# Patient Record
Sex: FEMALE | Race: WHITE | NOT HISPANIC OR LATINO | URBAN - METROPOLITAN AREA
[De-identification: names, ages, dates, MRNs, and addresses within clinical notes are randomized per-mention and may not be internally consistent; named-entity substitution may affect disease eponyms.]

---

## 2020-03-05 ENCOUNTER — EMERGENCY (EMERGENCY)
Facility: HOSPITAL | Age: 60
LOS: 1 days | Discharge: HOME | End: 2020-03-05
Attending: EMERGENCY MEDICINE
Payer: COMMERCIAL

## 2020-03-05 VITALS
DIASTOLIC BLOOD PRESSURE: 84 MMHG | RESPIRATION RATE: 16 BRPM | OXYGEN SATURATION: 97 % | SYSTOLIC BLOOD PRESSURE: 145 MMHG | HEART RATE: 88 BPM

## 2020-03-05 LAB
ALBUMIN SERPL ELPH-MCNC: 4.7 G/DL — SIGNIFICANT CHANGE UP (ref 3.3–5)
ALP SERPL-CCNC: 46 U/L — SIGNIFICANT CHANGE UP (ref 40–120)
ALT FLD-CCNC: 15 U/L — SIGNIFICANT CHANGE UP (ref 10–45)
ANION GAP SERPL CALC-SCNC: 19 MMOL/L — HIGH (ref 5–17)
APTT BLD: 33 SEC — SIGNIFICANT CHANGE UP (ref 27.5–36.3)
AST SERPL-CCNC: 18 U/L — SIGNIFICANT CHANGE UP (ref 10–40)
BASE EXCESS BLDV CALC-SCNC: -0.6 MMOL/L — SIGNIFICANT CHANGE UP (ref -2–2)
BASOPHILS # BLD AUTO: 0.02 K/UL — SIGNIFICANT CHANGE UP (ref 0–0.2)
BASOPHILS NFR BLD AUTO: 0.3 % — SIGNIFICANT CHANGE UP (ref 0–2)
BILIRUB SERPL-MCNC: 0.2 MG/DL — SIGNIFICANT CHANGE UP (ref 0.2–1.2)
BUN SERPL-MCNC: 8 MG/DL — SIGNIFICANT CHANGE UP (ref 7–23)
CA-I SERPL-SCNC: 1.17 MMOL/L — SIGNIFICANT CHANGE UP (ref 1.12–1.3)
CALCIUM SERPL-MCNC: 9.5 MG/DL — SIGNIFICANT CHANGE UP (ref 8.4–10.5)
CHLORIDE BLDV-SCNC: 108 MMOL/L — SIGNIFICANT CHANGE UP (ref 96–108)
CHLORIDE SERPL-SCNC: 103 MMOL/L — SIGNIFICANT CHANGE UP (ref 96–108)
CO2 BLDV-SCNC: 27 MMOL/L — SIGNIFICANT CHANGE UP (ref 22–30)
CO2 SERPL-SCNC: 21 MMOL/L — LOW (ref 22–31)
CREAT SERPL-MCNC: 0.64 MG/DL — SIGNIFICANT CHANGE UP (ref 0.5–1.3)
EOSINOPHIL # BLD AUTO: 0.05 K/UL — SIGNIFICANT CHANGE UP (ref 0–0.5)
EOSINOPHIL NFR BLD AUTO: 0.6 % — SIGNIFICANT CHANGE UP (ref 0–6)
ETHANOL SERPL-MCNC: 346 MG/DL — HIGH (ref 0–10)
GAS PNL BLDV: 143 MMOL/L — SIGNIFICANT CHANGE UP (ref 135–145)
GAS PNL BLDV: SIGNIFICANT CHANGE UP
GLUCOSE BLDV-MCNC: 104 MG/DL — HIGH (ref 70–99)
GLUCOSE SERPL-MCNC: 108 MG/DL — HIGH (ref 70–99)
HCO3 BLDV-SCNC: 26 MMOL/L — SIGNIFICANT CHANGE UP (ref 21–29)
HCT VFR BLD CALC: 39.1 % — SIGNIFICANT CHANGE UP (ref 34.5–45)
HCT VFR BLDA CALC: 42 % — SIGNIFICANT CHANGE UP (ref 39–50)
HGB BLD CALC-MCNC: 13.8 G/DL — SIGNIFICANT CHANGE UP (ref 11.5–15.5)
HGB BLD-MCNC: 13.2 G/DL — SIGNIFICANT CHANGE UP (ref 11.5–15.5)
IMM GRANULOCYTES NFR BLD AUTO: 0.1 % — SIGNIFICANT CHANGE UP (ref 0–1.5)
INR BLD: 0.87 RATIO — LOW (ref 0.88–1.16)
LACTATE BLDV-MCNC: 3 MMOL/L — HIGH (ref 0.7–2)
LYMPHOCYTES # BLD AUTO: 3.68 K/UL — HIGH (ref 1–3.3)
LYMPHOCYTES # BLD AUTO: 47.6 % — HIGH (ref 13–44)
MCHC RBC-ENTMCNC: 32.8 PG — SIGNIFICANT CHANGE UP (ref 27–34)
MCHC RBC-ENTMCNC: 33.8 GM/DL — SIGNIFICANT CHANGE UP (ref 32–36)
MCV RBC AUTO: 97.3 FL — SIGNIFICANT CHANGE UP (ref 80–100)
MONOCYTES # BLD AUTO: 0.56 K/UL — SIGNIFICANT CHANGE UP (ref 0–0.9)
MONOCYTES NFR BLD AUTO: 7.2 % — SIGNIFICANT CHANGE UP (ref 2–14)
NEUTROPHILS # BLD AUTO: 3.41 K/UL — SIGNIFICANT CHANGE UP (ref 1.8–7.4)
NEUTROPHILS NFR BLD AUTO: 44.2 % — SIGNIFICANT CHANGE UP (ref 43–77)
NRBC # BLD: 0 /100 WBCS — SIGNIFICANT CHANGE UP (ref 0–0)
PCO2 BLDV: 50 MMHG — SIGNIFICANT CHANGE UP (ref 35–50)
PH BLDV: 7.33 — LOW (ref 7.35–7.45)
PLATELET # BLD AUTO: 324 K/UL — SIGNIFICANT CHANGE UP (ref 150–400)
PO2 BLDV: 40 MMHG — SIGNIFICANT CHANGE UP (ref 25–45)
POTASSIUM BLDV-SCNC: 3.5 MMOL/L — SIGNIFICANT CHANGE UP (ref 3.5–5.3)
POTASSIUM SERPL-MCNC: 3.7 MMOL/L — SIGNIFICANT CHANGE UP (ref 3.5–5.3)
POTASSIUM SERPL-SCNC: 3.7 MMOL/L — SIGNIFICANT CHANGE UP (ref 3.5–5.3)
PROT SERPL-MCNC: 7.5 G/DL — SIGNIFICANT CHANGE UP (ref 6–8.3)
PROTHROM AB SERPL-ACNC: 9.9 SEC — LOW (ref 10–12.9)
RBC # BLD: 4.02 M/UL — SIGNIFICANT CHANGE UP (ref 3.8–5.2)
RBC # FLD: 12.2 % — SIGNIFICANT CHANGE UP (ref 10.3–14.5)
SAO2 % BLDV: 61 % — LOW (ref 67–88)
SODIUM SERPL-SCNC: 143 MMOL/L — SIGNIFICANT CHANGE UP (ref 135–145)
WBC # BLD: 7.73 K/UL — SIGNIFICANT CHANGE UP (ref 3.8–10.5)
WBC # FLD AUTO: 7.73 K/UL — SIGNIFICANT CHANGE UP (ref 3.8–10.5)

## 2020-03-05 PROCEDURE — 71045 X-RAY EXAM CHEST 1 VIEW: CPT | Mod: 26

## 2020-03-05 PROCEDURE — 70450 CT HEAD/BRAIN W/O DYE: CPT | Mod: 26

## 2020-03-05 PROCEDURE — 72125 CT NECK SPINE W/O DYE: CPT | Mod: 26

## 2020-03-05 PROCEDURE — 99291 CRITICAL CARE FIRST HOUR: CPT

## 2020-03-05 RX ORDER — SODIUM CHLORIDE 9 MG/ML
1000 INJECTION INTRAMUSCULAR; INTRAVENOUS; SUBCUTANEOUS ONCE
Refills: 0 | Status: COMPLETED | OUTPATIENT
Start: 2020-03-05 | End: 2020-03-05

## 2020-03-05 RX ORDER — HALOPERIDOL DECANOATE 100 MG/ML
2 INJECTION INTRAMUSCULAR ONCE
Refills: 0 | Status: COMPLETED | OUTPATIENT
Start: 2020-03-05 | End: 2020-03-05

## 2020-03-05 RX ORDER — ONDANSETRON 8 MG/1
4 TABLET, FILM COATED ORAL ONCE
Refills: 0 | Status: COMPLETED | OUTPATIENT
Start: 2020-03-05 | End: 2020-03-05

## 2020-03-05 RX ORDER — SODIUM CHLORIDE 9 MG/ML
2000 INJECTION, SOLUTION INTRAVENOUS ONCE
Refills: 0 | Status: COMPLETED | OUTPATIENT
Start: 2020-03-05 | End: 2020-03-05

## 2020-03-05 RX ORDER — DIPHENHYDRAMINE HCL 50 MG
25 CAPSULE ORAL ONCE
Refills: 0 | Status: COMPLETED | OUTPATIENT
Start: 2020-03-05 | End: 2020-03-05

## 2020-03-05 RX ADMIN — ONDANSETRON 4 MILLIGRAM(S): 8 TABLET, FILM COATED ORAL at 22:53

## 2020-03-05 RX ADMIN — HALOPERIDOL DECANOATE 2 MILLIGRAM(S): 100 INJECTION INTRAMUSCULAR at 22:06

## 2020-03-05 RX ADMIN — SODIUM CHLORIDE 2000 MILLILITER(S): 9 INJECTION, SOLUTION INTRAVENOUS at 23:00

## 2020-03-05 RX ADMIN — SODIUM CHLORIDE 1000 MILLILITER(S): 9 INJECTION INTRAMUSCULAR; INTRAVENOUS; SUBCUTANEOUS at 22:58

## 2020-03-05 RX ADMIN — Medication 25 MILLIGRAM(S): at 22:53

## 2020-03-05 RX ADMIN — Medication 1 MILLIGRAM(S): at 22:46

## 2020-03-05 NOTE — ED PROVIDER NOTE - CLINICAL SUMMARY MEDICAL DECISION MAKING FREE TEXT BOX
59 y F unknown pmh/psh/allergies bibems reported to be intoxicated, altered, slurring speech, no etoh on breath on my exam, initially cooperative with PA-C interview/exam but on my evaluation became acutely agitated, combative, kicking and punching at hospital staff.  Unable to verbally deescalate/redirect. 59 y F unknown pmh/psh/allergies bibems reported to be intoxicated, altered, slurring speech, no etoh on breath on my exam, initially cooperative with PA-C interview/exam but on my evaluation became acutely agitated, combative, kicking and punching at hospital staff.  Unable to verbally deescalate/redirect.  Given lack of etoh on breath needs sedation for her own and staff's safety and to help to expedite emergent CT head to eval for intracranial pathology (SAH, subdural hemorrhage, CVA, etc), labs, fluids, ua, cxr, ekg, reassess.  Closely monitor.  Rpt FS.  Assuming no emergent findings on w/u will need obs until clinically sober.  --ROLA 59 y F unknown pmh/psh/allergies bibems reported to be intoxicated, altered, slurring speech, no etoh on breath on my exam, initially cooperative with PA-C interview/exam but on my evaluation became acutely agitated, combative, kicking and punching at hospital staff.  Unable to verbally deescalate/redirect.  No overt evidence of trauma on examination; no juventino/grey quintero, bruising to abd/chest/back.  No scalp hematoma/lacerations, supple neck with no stepoffs on exam.  No trauma reported by EMS.  Given lack of etoh on breath needs sedation for her own and staff's safety and to help to expedite emergent CT head to eval for intracranial pathology (SAH, subdural hemorrhage, CVA, etc), labs, fluids, ua, cxr, ekg, reassess.  Closely monitor.  Rpt FS.  Assuming no emergent findings on w/u will need obs until clinically sober.  --BMM 59 y F unknown pmh/psh/allergies bibems reported to be intoxicated, altered, slurring speech, no etoh on breath on my exam, initially cooperative with PA-C interview/exam but on my evaluation became acutely agitated, combative, kicking and punching at hospital staff.  Unable to verbally deescalate/redirect.  No overt evidence of trauma on examination; no juventino/grey quintero, bruising to abd/chest/back.  No scalp hematoma/lacerations, supple neck with no stepoffs on exam.  No trauma reported by EMS.  Given lack of etoh on breath needs sedation for her own and staff's safety and to help to expedite emergent CT head to eval for intracranial pathology (subarachnoid hemorrhage, subdural hemorrhage, mass, CVA, etc), labs, fluids, ua, cxr, ekg, reassess.  Closely monitor.  Rpt FS.  Assuming no emergent findings on w/u will need obs until clinically sober.  --ROLA

## 2020-03-05 NOTE — ED PROVIDER NOTE - PHYSICAL EXAMINATION
GCS 14 (M6 V4 E4); no respiratory distress.  Combative/agitated.    A -- patent, phonating, no stridor, protecting airway  B -- CTA B/L, no flail segment, crepitation or tracheal deviation  C -- +2 rad pulse b/l, +2 DP b/l.  HR -- 88  , BP -- 145/84  D -- Combative, agitated, physically/verbally abusive.  Kicking/hitting staff.  Moving all extremity, no obvious focal deficits; intermittently follows commands    HEENT: unremarkable vlad/nasopharynx; no luxated teeth, no jaw malocclusion, no facial deformities, no crepitation, no obvious scalp lacerations/hematomas, no busby/raccoon.  No septal hematoma.  MMM, no jugular venous distension, supple neck, PERRLA, EOMI, nonicteric sclera, midline trachea; PULM: CTA B, no crackles/rubs/rales; CV: reg rate, reg rhythm, S1S2, no MRG; ABD: Flat abdomen, NTND, no R/G/R, no CVAT.  MSK:  No C/T/L midline tenderness to palpation or stepoff.  GUARDADO without obvious limitation.  No extremity deformities.  Compartments are soft throughout, +2 pulses x4;  NEURO: No obvious focal deficits; gross motor 5/5 and symmetric in b/l UE/LE, no sensory deficits in b/l UE/LE; PSYCH: AAOx3, no obvious intoxication, clear thought and normal sensorium.  SKIN -- scattered abrasions to b/l dorsum of wrists, no obvious lacerations.  No ecchymosis.  No juventino/GT.  No crepitation throughout.

## 2020-03-05 NOTE — ED PROVIDER NOTE - NS ED ROS FT
Attending note -- below ROS from ALFREDO Castaneda obtained prior to patient's agitation/combativeness.  Patient was not able to provide me a meaningful review of systems.

## 2020-03-05 NOTE — ED ADULT NURSE NOTE - OBJECTIVE STATEMENT
59 YOF A&OX3 59 YOF brought in by EMS for intoxication and combative behavior. EMS states pt was at dinner with family drinking ETOH when she started cursing and becoming combative in restaurant parking lot with PD. Pt started kicking, punching and cursing at PD and EMS as per EMS. pt currently unable to state where she is, time of date and who she is. pt had 1 episode of vomiting in [facility] ED. pt slurring speech and combative with hospital staff. pt placed on 1:1 constant observation, belongings secured and locked, pt wanded by security. pt currently denies sob, chest pain, n/v/d, headaches, dizziness, blurry vision. safety maintained.

## 2020-03-05 NOTE — ED PROVIDER NOTE - NSFOLLOWUPINSTRUCTIONS_ED_ALL_ED_FT
D/C with PMD follow up and anticipatory guidance.  Return for worsening or persistent symptoms.    FOLLOW UP WITH YOUR FAMILY DOCTOR  IN THE NEXT 48 HOURS

## 2020-03-05 NOTE — ED PROVIDER NOTE - OBJECTIVE STATEMENT
60 y/o female who presents with EMS for intox. patient was reportedly at a restaurant, drunk and yelling that people were out to get her. she became combative with EMS and was subsequently handcuffed. patient comes in calm but slurring speech. no acute complaints, denies etoh use or drug use. states she was at dinner with family. no reported trauma 60 y/o female who presents with EMS for intox. patient was reportedly at a restaurant, drunk and yelling that people were out to get her. she became combative with EMS and was subsequently handcuffed. patient comes in calm but slurring speech. no acute complaints, denies etoh use or drug use. states she was at dinner with family. no reported trauma    Attending note -- above HPI from ALFREDO Castaneda obtained prior to patient's agitation/combativeness.  Patient was not able to provide me a meaningful hpi.

## 2020-03-05 NOTE — ED PROVIDER NOTE - PATIENT PORTAL LINK FT
You can access the FollowMyHealth Patient Portal offered by [facility] by registering at the following website: [email or web address]. By joining [employer]’s FollowMyHealth portal, you will also be able to view your health information using other applications (apps) compatible with our system.

## 2020-03-05 NOTE — ED PROVIDER NOTE - PROGRESS NOTE DETAILS
Given patient's agitation, combativeness and aggression/violent behavior towards staff patient was chemically sedated for the safety of herself and the staff.  Haldol 2mg/ativan 1mg/benadryl 25mg x 1 given in R arm with resulting sedation.  VSS throughout; no significant bradypnea, vomiting, or aspiration event.  Remained arousable to noxious stimuli throughout.  CT head performed emergently to eval for SAH/SDH, grossly normal.  Labs demonstrated etoh 340s, lact 3, otherwise grossly unremarkable aside from mild metabolic gap acidosis (presumptive 2/2 etoh and lactate).   EKG unremarkable.  Will c/t monitor, likely will require observation throughout the night, reassess when clinically sober.  --BMM Patient reassessed.  Sedate, comfortable, RR 12, ETCO2 mid-high 20s.  Arouses and localizes to noxious stimuli.  Signed out to Dr. Pearl pending obs to clinical sobriety.  Still no contact from family member, unable to open iphone, no answer from emergency contact when dialed by ALFREDO Castaneda.  --M Attending Dae:  evaluated pt earlier. vitals stable, etx02 20's response to steral rub opens eyes ,pt was intoxicated and given HAC for agitation, workup w/ head ct was neg. getting bg checks. plan to have pt metabolize and re eavluate Attending Masom:  Pt alert and oriented, unsteady on ambulation. not fully metabolized yet pt ambulated without difficulty, appears clinically sober Given her agitation, combativeness and aggression/violent behavior towards staff, patient was chemically sedated for the safety of herself and the staff.  Haldol 2mg/ativan 1mg/benadryl 25mg x 1 given in R arm with resulting sedation.  VSS throughout; no episodes of bradypnea, vomiting, or aspiration events.  SpO2 100 throughout.  Remained arousable to noxious stimuli throughout.  CT head performed emergently to eval for SAH/SDH, grossly normal.  Labs demonstrated etoh 340s, lact 3, otherwise grossly unremarkable aside from mild metabolic gap acidosis (presumptive 2/2 etoh and lactate).   EKG unremarkable.  ALFREDO Castaneda attempted to call patient's emergency contact but no answer.  Unable to open patient's phone to call family.  Will c/t monitor, likely will require observation throughout the night, reassess when clinically sober.  --DKM Patient reassessed.  Sedate, comfortable, RR 12, ETCO2 mid-high 20s, SpO2 100 without episodes of hypoxia.  Arouses and localizes to noxious stimuli.  Signed out to Dr. Pearl pending obs to clinical sobriety.  Still no contact from family members, unable to open iphone, no answer from emergency contact when dialed by ALFREDO whitfield.  --BM

## 2020-03-06 VITALS
TEMPERATURE: 98 F | OXYGEN SATURATION: 96 % | RESPIRATION RATE: 17 BRPM | HEART RATE: 88 BPM | DIASTOLIC BLOOD PRESSURE: 63 MMHG | SYSTOLIC BLOOD PRESSURE: 120 MMHG

## 2020-03-06 PROCEDURE — 82435 ASSAY OF BLOOD CHLORIDE: CPT

## 2020-03-06 PROCEDURE — 93005 ELECTROCARDIOGRAM TRACING: CPT

## 2020-03-06 PROCEDURE — 85730 THROMBOPLASTIN TIME PARTIAL: CPT

## 2020-03-06 PROCEDURE — 82803 BLOOD GASES ANY COMBINATION: CPT

## 2020-03-06 PROCEDURE — 85027 COMPLETE CBC AUTOMATED: CPT

## 2020-03-06 PROCEDURE — 96374 THER/PROPH/DIAG INJ IV PUSH: CPT

## 2020-03-06 PROCEDURE — 85014 HEMATOCRIT: CPT

## 2020-03-06 PROCEDURE — 83605 ASSAY OF LACTIC ACID: CPT

## 2020-03-06 PROCEDURE — 96372 THER/PROPH/DIAG INJ SC/IM: CPT | Mod: XU

## 2020-03-06 PROCEDURE — 82962 GLUCOSE BLOOD TEST: CPT

## 2020-03-06 PROCEDURE — 85610 PROTHROMBIN TIME: CPT

## 2020-03-06 PROCEDURE — 80307 DRUG TEST PRSMV CHEM ANLYZR: CPT

## 2020-03-06 PROCEDURE — 72125 CT NECK SPINE W/O DYE: CPT

## 2020-03-06 PROCEDURE — 82947 ASSAY GLUCOSE BLOOD QUANT: CPT

## 2020-03-06 PROCEDURE — 99285 EMERGENCY DEPT VISIT HI MDM: CPT | Mod: 25

## 2020-03-06 PROCEDURE — 82330 ASSAY OF CALCIUM: CPT

## 2020-03-06 PROCEDURE — 71045 X-RAY EXAM CHEST 1 VIEW: CPT

## 2020-03-06 PROCEDURE — 84132 ASSAY OF SERUM POTASSIUM: CPT

## 2020-03-06 PROCEDURE — 70450 CT HEAD/BRAIN W/O DYE: CPT

## 2020-03-06 PROCEDURE — 84295 ASSAY OF SERUM SODIUM: CPT

## 2020-03-06 PROCEDURE — 80053 COMPREHEN METABOLIC PANEL: CPT

## 2020-03-06 NOTE — ED ADULT NURSE REASSESSMENT NOTE - NS ED NURSE REASSESS COMMENT FT1
pt walked with 1 assistance to bathroom. safety maintained. 1:1 constant observation maintained at bedside.

## 2020-03-06 NOTE — ED ADULT NURSE REASSESSMENT NOTE - NS ED NURSE REASSESS COMMENT FT1
pt appears to be in no acute distress. VSS. awaiting pt to metabolize ETOH. 1:1 constant observation still in place. safety maintained.

## 2020-03-06 NOTE — ED ADULT NURSE REASSESSMENT NOTE - NS ED NURSE REASSESS COMMENT FT1
pt appears to be in no acute distress. VSS. 1:1 constant observation maintained. awaiting pt to metabolize ETOH. safety maintained.